# Patient Record
(demographics unavailable — no encounter records)

---

## 2025-01-29 NOTE — HISTORY OF PRESENT ILLNESS
[Student] : Work status: student [Sudden] : sudden [2] : 2 [Radiating] : radiating [de-identified] : 1/29/25  R ankle injured sledding accident.  No prior  Seen at [facility] in [facility] boot [] : no [FreeTextEntry1] : rt ankle [FreeTextEntry5] : pt  was in a sledding accident , foot slammed between sled and curb last week , went to an urgent care for xr [FreeTextEntry7] : foot [FreeTextEntry9] : pt put on a short cam boot, occ [employer]  [de-identified] : urgent care [de-identified] : 12

## 2025-01-29 NOTE — REVIEW OF SYSTEMS
[Joint Pain] : joint pain [Negative] : Heme/Lymph [Joint Swelling] : joint swelling [FreeTextEntry9] : bruising

## 2025-01-29 NOTE — PHYSICAL EXAM
[Right] : right foot and ankle [Mild] : mild diffused ankle swelling [5th] : 5th [2+] : dorsalis pedis pulse: 2+ [] : no metatarsal tenderness

## 2025-01-29 NOTE — RETURN TO WORK/SCHOOL
Patient's FSGs are controlled on current hypoglycemics.   Last A1c reviewed-   Lab Results   Component Value Date    HGBA1C 8.1 (H) 01/06/2019     Most recent fingerstick glucose reviewed-   Recent Labs   Lab 03/31/19  1639 03/31/19  2137 04/01/19  1207   POCTGLUCOSE 159* 176* 135*     Current correctional scale  Low  Maintain anti-hyperglycemic dose as follows-   Antihyperglycemics (From admission, onward)    Start     Stop Route Frequency Ordered    04/01/19 2100  insulin detemir U-100 pen 10 Units      -- SubQ Nightly 04/01/19 0604    03/30/19 1037  insulin aspart U-100 pen 0-5 Units      -- SubQ Before meals & nightly PRN 03/30/19 0938           [Return Date: _____] : [unfilled] can return to school as of [unfilled] [Participate in P.E.] : may participate in physical education